# Patient Record
Sex: FEMALE
[De-identification: names, ages, dates, MRNs, and addresses within clinical notes are randomized per-mention and may not be internally consistent; named-entity substitution may affect disease eponyms.]

---

## 2024-05-23 ENCOUNTER — APPOINTMENT (OUTPATIENT)
Dept: ORTHOPEDIC SURGERY | Facility: CLINIC | Age: 49
End: 2024-05-23

## 2024-05-23 ENCOUNTER — APPOINTMENT (OUTPATIENT)
Dept: ORTHOPEDIC SURGERY | Facility: CLINIC | Age: 49
End: 2024-05-23
Payer: OTHER GOVERNMENT

## 2024-05-23 VITALS — WEIGHT: 160 LBS | BODY MASS INDEX: 29.44 KG/M2 | HEIGHT: 62 IN

## 2024-05-23 DIAGNOSIS — Z78.9 OTHER SPECIFIED HEALTH STATUS: ICD-10-CM

## 2024-05-23 DIAGNOSIS — M25.571 PAIN IN RIGHT ANKLE AND JOINTS OF RIGHT FOOT: ICD-10-CM

## 2024-05-23 DIAGNOSIS — M25.572 PAIN IN RIGHT ANKLE AND JOINTS OF RIGHT FOOT: ICD-10-CM

## 2024-05-23 PROBLEM — Z00.00 ENCOUNTER FOR PREVENTIVE HEALTH EXAMINATION: Status: ACTIVE | Noted: 2024-05-23

## 2024-05-23 PROCEDURE — 73630 X-RAY EXAM OF FOOT: CPT | Mod: 50

## 2024-05-23 PROCEDURE — 99203 OFFICE O/P NEW LOW 30 MIN: CPT

## 2024-05-24 PROBLEM — M25.571 ACUTE BILATERAL ANKLE PAIN: Status: ACTIVE | Noted: 2024-05-24

## 2024-05-31 NOTE — HISTORY OF PRESENT ILLNESS
[7] : 7 [Dull/Aching] : dull/aching [Localized] : localized [Tightness] : tightness [Constant] : constant [Standing] : standing [Walking] : walking [Stairs] : stairs [FreeTextEntry5] : Patient fell down her basement steps and twisted both ankles/feet about 12 days ago, no prior hx. went to an urgent care but did not bring x rays. Left ankle is bothering her the most   h/o multiple prior r ankle sprains  [de-identified] : Patient fell down her basement steps and twisted both ankles/feet about 12 days ago, no prior hx. went to an urgent care but did not bring x rays. Left ankle is bothering her the most. Painful ambulation  h/o multiple prior r ankle sprains  [] : Post Surgical Visit: no [FreeTextEntry1] : effie foot

## 2024-05-31 NOTE — IMAGING
[de-identified] : ----------------------------------------------------------------------------  Bilateral foot/ankle exam:   Inspection:   (-) Effusion    Swelling:   (-) Calf                          (min R ) Lateral ankle        (mild L) Medial ankle                            (-) Lateral foot          (-) Medial foot      (-) Dorsal foot                         (-) 1st MTP                (-) Toes    Deformity:  (-) Hindfoot varus     (-) Valgus                      (-) Pes planus            (-) Pes cavus                      Other: Range of Motion:   Right: DF: 15   PF: 40   Eversion: 30   Inversion: 30                                   Left: DF: 15   PF: 25   Eversion: 15   Inversion: 15 Tenderness:     (-) Calf                                    (-) Tibia         (-) Syndesmosis    (+R) ATFL/CFL    (+L) Deltoid ligament    (-) Lateral mal                         (-) Medial mal    (-) Lateral joint line                  (-) Medial joint line      (-) Anterior ankle joint line    (-) Achilles insertion               (-) Midsubstance    (mild) Plantar fascia insertion      (-) Midsubstance       (-) Navicular tuberosity          (-) Talonavicular         (-) Calcaneocuboid    (-) Metatarsals                        (-) TMT                       (mild L) Lisfranc    (-) 5th MT base    (-) 1st MTP joint    (-) Toes                         (-) Sesamoids Additional tests:     (-) Anterior drawer    (-) Ankle circumduction    (-) Syndesmosis compression    (-) Calcaneal compression    (-) Foot squeeze    (+) Pain with single leg heel rise Strength:    DF: 5/5    PF: 5/5    Eversion: 5/5    Inversion: 5/5    EHL: 5/5    FHL: 5/5 Neuro: Sensation In tact to light touch in all distributions Vascularity: Extremity warm and well perfused Gait: mild antalgic    [Bilateral] : foot bilaterally [Degenerative change] : Degenerative change